# Patient Record
Sex: MALE | Race: ASIAN | Employment: FULL TIME | URBAN - METROPOLITAN AREA
[De-identification: names, ages, dates, MRNs, and addresses within clinical notes are randomized per-mention and may not be internally consistent; named-entity substitution may affect disease eponyms.]

---

## 2017-08-14 ENCOUNTER — RADIANT APPOINTMENT (OUTPATIENT)
Dept: GENERAL RADIOLOGY | Facility: CLINIC | Age: 42
End: 2017-08-14
Attending: PHYSICAL MEDICINE & REHABILITATION
Payer: COMMERCIAL

## 2017-08-14 ENCOUNTER — OFFICE VISIT (OUTPATIENT)
Dept: ORTHOPEDICS | Facility: CLINIC | Age: 42
End: 2017-08-14
Payer: COMMERCIAL

## 2017-08-14 VITALS
HEIGHT: 70 IN | WEIGHT: 180 LBS | BODY MASS INDEX: 25.77 KG/M2 | DIASTOLIC BLOOD PRESSURE: 70 MMHG | SYSTOLIC BLOOD PRESSURE: 105 MMHG

## 2017-08-14 DIAGNOSIS — M21.42 PES PLANUS OF BOTH FEET: ICD-10-CM

## 2017-08-14 DIAGNOSIS — S86.899A MEDIAL TIBIAL STRESS SYNDROME, UNSPECIFIED LATERALITY, INITIAL ENCOUNTER: Primary | ICD-10-CM

## 2017-08-14 DIAGNOSIS — M25.562 ARTHRALGIA OF BOTH LOWER LEGS: ICD-10-CM

## 2017-08-14 DIAGNOSIS — M21.41 PES PLANUS OF BOTH FEET: ICD-10-CM

## 2017-08-14 DIAGNOSIS — M25.561 ARTHRALGIA OF BOTH LOWER LEGS: ICD-10-CM

## 2017-08-14 PROCEDURE — 73590 X-RAY EXAM OF LOWER LEG: CPT | Mod: LT | Performed by: PHYSICAL MEDICINE & REHABILITATION

## 2017-08-14 PROCEDURE — 99203 OFFICE O/P NEW LOW 30 MIN: CPT | Performed by: PHYSICAL MEDICINE & REHABILITATION

## 2017-08-14 NOTE — MR AVS SNAPSHOT
After Visit Summary   8/14/2017    Evans Robertson    MRN: [id number]           Patient Information     Date Of Birth          1975        Visit Information        Provider Department      8/14/2017 1:40 PM Jamir Hernandez DO [facility]        Today's Diagnoses     Medial tibial stress syndrome, unspecified laterality, initial encounter    -  1      Care Instructions    We addressed the following today:    1. Shin splints  2. Flat feet    Activity modification as discussed  Physical therapy: [facility] - [phone]  Topical Treatments: Ice  Over the counter medication: Acetaminophen (Tylenol) 1000 mg every 6 hours with food (Maximum of 3000 mg/day)  Ibuprofen (Advil) maximum of 800 mg four times a day with food  Other specific instructions: Obtain Super Feet shoe inserts for treatment purposes  Follow up in 4-6 weeks if no improvement of symptoms for further evaluation/medical car  (sooner if needed; call direct clinic number [[phone]] at any time with questions or concerns)              Follow-ups after your visit        Additional Services     [facility] PT, HAND, AND CHIROPRACTIC REFERRAL       **This order will print in the [facility] Scheduling Office**    Physical Therapy, Hand Therapy and Chiropractic Care are available through:    *[facility]  *[facility]  *[facility]    Call one number to schedule at any of the above locations: [phone].    Your provider has referred you to: Physical Therapy at [facility] or [facility]    Indication/Reason for Referral: Shin splints  Therapy Orders: Evaluate and Treat  Special Programs: Running Injury  Special Request: Brody Cotton      Additional Comments for the Therapist or Chiropractor: Formal physical therapy - exercises to includes active resistance dorsiflexion/plantarflexion exercises, including leg presses, heel raises, etc with progression to  "advanced proprioception/balance training with use of modalities as deemed appropriate with home exercise prescription.    Please be aware that coverage of these services is subject to the terms and limitations of your health insurance plan.  Call member services at your health plan with any benefit or coverage questions.      Please bring the following to your appointment:    *Your personal calendar for scheduling future appointments  *Comfortable clothing                  Who to contact     If you have questions or need follow up information about today's clinic visit or your schedule please contact [facility] directly at [phone].  Normal or non-critical lab and imaging results will be communicated to you by Devexhart, letter or phone within 4 business days after the clinic has received the results. If you do not hear from us within 7 days, please contact the clinic through [employer] or phone. If you have a critical or abnormal lab result, we will notify you by phone as soon as possible.  Submit refill requests through Klip or call your pharmacy and they will forward the refill request to us. Please allow 3 business days for your refill to be completed.          Additional Information About Your Visit        [employer] Information     Klip lets you send messages to your doctor, view your test results, renew your prescriptions, schedule appointments and more. To sign up, go to [email or web address] . Click on \"Log in\" on the left side of the screen, which will take you to the Welcome page. Then click on \"Sign up Now\" on the right side of the page.     You will be asked to enter the access code listed below, as well as some personal information. Please follow the directions to create your username and password.     Your access code is: [email or web address]  Expires: 2017  2:44 PM     Your access code will  in 90 days. If you need help or a new code, please call your [place] clinic or " "[phone].        Care EveryWhere ID     This is your Care EveryWhere ID. This could be used by other organizations to access your [facility] medical records  [id number]        Your Vitals Were     Height BMI (Body Mass Index)                5' 10\" (1.778 m) 25.83 kg/m2           Blood Pressure from Last 3 Encounters:   08/14/17 105/70   08/16/05 92/54    Weight from Last 3 Encounters:   08/14/17 180 lb (81.6 kg)   08/16/05 187 lb (84.8 kg)              We Performed the Following     [facility] PT, HAND, AND CHIROPRACTIC REFERRAL        Primary Care Provider    Jennifer Streeter MD       No address on file        Equal Access to Services     [facility] : Hadii laura Romero, wateo arias, anita kaalmada wendy, luis jackson . So [facility] [phone].    ATENCIÓN: Si habla español, tiene a dugan disposición servicios gratuitos de asistencia lingüística. Llame al [phone].    We comply with applicable federal civil rights laws and [place] laws. We do not discriminate on the basis of race, color, national origin, age, disability sex, sexual orientation or gender identity.            Thank you!     Thank you for choosing [facility]  for your care. Our goal is always to provide you with excellent care. Hearing back from our patients is one way we can continue to improve our services. Please take a few minutes to complete the written survey that you may receive in the mail after your visit with us. Thank you!             Your Updated Medication List - Protect others around you: Learn how to safely use, store and throw away your medicines at www.disposemymeds.org.          This list is accurate as of: 8/14/17  2:44 PM.  Always use your most recent med list.                   Brand Name Dispense Instructions for use Diagnosis    NASONEX 50 MCG/ACT spray   Generic drug:  mometasone     1    2 sprays each nostril Once daily for allergies          "

## 2017-08-14 NOTE — PATIENT INSTRUCTIONS
We addressed the following today:    1. Shin splints  2. Flat feet    Activity modification as discussed  Physical therapy: [facility] - [phone]  Topical Treatments: Ice  Over the counter medication: Acetaminophen (Tylenol) 1000 mg every 6 hours with food (Maximum of 3000 mg/day)  Ibuprofen (Advil) maximum of 800 mg four times a day with food  Other specific instructions: Obtain Super Feet shoe inserts for treatment purposes  Follow up in 4-6 weeks if no improvement of symptoms for further evaluation/medical car  (sooner if needed; call direct clinic number [[phone]] at any time with questions or concerns)

## 2017-08-14 NOTE — PROGRESS NOTES
" [facility]   Clinic Visit s Aug 14, 2017    Subjective:  Evans Robertson is a 42 year old male who is seen as self referral for evaluation of acute bilateral leg pain, right greater than left.    Symptoms began 1 month ago.  Reports insidious onset without acute precipitating event. Recently started to get back into running starting in May, started running about 1 mile at a time and has built up his mileage to 6 miles at a time, approximately 3-4 times/week. Reports bilateral medial leg pain, right greater than left, with radiation absent.  Pain is 5/10 in maximal severity and 0/10 currently.  Symptoms are generally worse with running activities and with going down stairs and better with rest.  Other treatment has consisted of ice and stretching activities with minimal relief. Reports feeling of numbness/weakness of the lower extremities. Denies any previous leg injuries/surgeries.    Patient's past medical, surgical, social, and family histories are reviewed today.  There are no significant contributory medical issues  Past Medical History:   Diagnosis Date     Allergic rhinitis, cause unspecified     Managed by allergist       Review of Systems:  Constitutional: NEGATIVE for fever, chills, or change in weight  Skin: NEGATIVE for worrisome rashes, moles, or lesions  Neuro: POSITIVE for weakness of the lower extremities  MSK: see HPI  Additional 10 point ROS is negative other than symptoms noted above and in HPI    Objective:  /70  Ht 5' 10\" (1.778 m)  Wt 180 lb (81.6 kg)  BMI 25.83 kg/m2  General: healthy, alert, and in no distress  Skin: no suspicious lesions or rashes  Psych: mentation appears normal and affect normal/bright  HEENT: no scleral icterus  CV: no pedal edema  Resp: normal respiratory effort without conversational dyspnea   Neuro: motor strength as noted below  Lymph: no palpable lymphadenopathy    MSK:    BILATERAL LEGS  Inspection:    Flat feet without erythema or " ecchymosis  Palpation:    Not tender over the proximal tibia, middle tibia, distal tibia, proximal fibula, distal fibula, mid fibula, lateral gastrocnemius, and medial gastrocnemius  Strength:    Dorsiflexion 5/5, plantarflexion 5/5, inversion 5/5, and eversion 5/5  Special Tests:    Positive hop test on the right, negative hop test on the left, negative squeeze testing bilaterally, and negative Winslow's testing bilaterally    Imaging:  Bilateral tibia/fibula x-rays (3 views) were ordered, independent visualization of images was performed, and interpreted in the office today  Impression:   1. Cortical irregularity noted of the posterior portion of the left tibia of unknown significance.  2. Fabella noted of the posterior portion of the knees.   3. Negative for fracture, subluxation, or other acute osseous abnormalities.    ASSESSMENT:  1. Bilateral medial tibial stress syndrome  2. Bilateral pes planus    PLAN:  1. Activity modification as discussed, including limitation of activities that cause pain/discomfort.  Instructed on importance of alternating running with cross training activities and to cut back on running activities by approximately 50% and increase by approximately 10% per week as able as tolerated.  2. Acetaminophen/Ibuprofen/ice as needed for improved pain control.  3. Instructed to obtain over-the-counter Super Feet shoe inserts for further treatment purposes.  4. Formal physical therapy - exercises to includes active resistance dorsiflexion/plantarflexion exercises, including leg presses, heel raises, etc with progression to advanced proprioception/balance training with use of modalities as deemed appropriate with home exercise prescription.  5. Follow-up in 4-6 weeks for further evaluation/medical care.  Instructed to contact our office should the condition evolve or worsen.    Patient's conditions were thoroughly discussed during today's visit with greater than 50% of the visit spent counseling  the patient with total time spent face-to-face with the patient being 15 minutes.    Jamir Hernandez DO, CAM  [facility]    Disclaimer: This note consists of symbols derived from keyboarding, dictation and/or voice recognition software. As a result, there may be errors in the script that have gone undetected. Please consider this when interpreting information found in this chart.

## 2017-08-18 ENCOUNTER — THERAPY VISIT (OUTPATIENT)
Dept: PHYSICAL THERAPY | Facility: CLINIC | Age: 42
End: 2017-08-18
Payer: COMMERCIAL

## 2017-08-18 DIAGNOSIS — S86.891A SHIN SPLINTS, RIGHT, INITIAL ENCOUNTER: Primary | ICD-10-CM

## 2017-08-18 PROCEDURE — 97110 THERAPEUTIC EXERCISES: CPT | Mod: GP | Performed by: PHYSICAL THERAPIST

## 2017-08-18 PROCEDURE — 97161 PT EVAL LOW COMPLEX 20 MIN: CPT | Mod: GP | Performed by: PHYSICAL THERAPIST

## 2017-08-18 NOTE — PROGRESS NOTES
Subjective:    Patient is a 42 year old male presenting with rehab right ankle/foot hpi.   Affected Side: bilateral shins R>L.  Condition occurred with:  Repetition/overuse.  Condition occurred: during recreation/sport.  This is a new condition  Onset of bilateral lower leg pain secondary to increasing running mileage 5 weeks ago. Pt has noted decreased pain since stopping running. Pt referred by MD for physical therapy on 8-14-17.    Patient reports pain:  Medial.    Pain is described as aching and sharp and is intermittent and reported as 5/10.  Associated symptoms:  Tingling, numbness and buckling/giving out. Pain is worse during the day.  Symptoms are exacerbated by ascending stairs, descending stairs, running, walking, weight bearing and bending/squatting and relieved by rest and ice.  Since onset symptoms are gradually improving.  Special tests:  X-ray (see report).  Previous treatment: none.    General health as reported by patient is good.  Pertinent medical history includes:  Sleep disorder/apnea.  Medical allergies: no.  Other surgeries include:  None reported.  Current medications:  None as reported by patient.  Current occupation is Teacher.  Employment status: pt on summer break, returns to work in 1 week.  Primary job tasks include:  Prolonged standing.        Red flags:  Chest pain.                        Objective:    Standing Alignment:                Ankle/Foot:  Pes planus R and pes planus L              Ankle/Foot Evaluation  ROM:    AROM:    Dorsiflexion: Left:    Right:   10  Plantarflexion: Left:     Right:  58          PROM:    Dorsiflexion: Left:        Right:   15   Plantarflexion: Left:        Right:  60               Strength:    Dorsiflexion:  Left: 4-/5     Pain:   Right: 4-/5   Pain:  Plantarflexion: Left: 5/5   Pain:   Right: 5/5  Pain:  Inversion:Left: 5/5  Pain:     Right: 5/5  Pain:  Eversion:Left: 5/5  Pain:  Right: 5/5  Pain:              Strength wnl ankle: weak pelvic  stabilizers.  LIGAMENT TESTING: normal                PALPATION: Palpation of ankle: point tenderness bilateral medial tibial ridge.                                                          General     ROS    Assessment/Plan:      Patient is a 42 year old male with both sides ankle complaints.    Patient has the following significant findings with corresponding treatment plan.                Diagnosis 1:  Bilateral shin splints R>L  Pain -  hot/cold therapy, manual therapy, self management, education and home program  Decreased ROM/flexibility - manual therapy, therapeutic exercise, therapeutic activity and home program  Decreased strength - therapeutic exercise, therapeutic activities and home program    Therapy Evaluation Codes:   1) History comprised of:   Personal factors that impact the plan of care:      None.    Comorbidity factors that impact the plan of care are:      Sleep disorder/apnea and Weakness.     Medications impacting care: None.  2) Examination of Body Systems comprised of:   Body structures and functions that impact the plan of care:      Ankle and Hip.   Activity limitations that impact the plan of care are:      Jumping, Running, Sports, Stairs and Walking.  3) Clinical presentation characteristics are:   Stable/Uncomplicated.  4) Decision-Making    Low complexity using standardized patient assessment instrument and/or measureable assessment of functional outcome.  Cumulative Therapy Evaluation is: Low complexity.    Previous and current functional limitations:  (See Goal Flow Sheet for this information)    Short term and Long term goals: (See Goal Flow Sheet for this information)     Communication ability:  Patient appears to be able to clearly communicate and understand verbal and written communication and follow directions correctly.  Treatment Explanation - The following has been discussed with the patient:   RX ordered/plan of care  Anticipated outcomes  Possible risks and side  effects  This patient would benefit from PT intervention to resume normal activities.   Rehab potential is good.    Frequency:  1 X week, once daily  Duration:  for 6 weeks  Discharge Plan:  Achieve all LTG.  Independent in home treatment program.  Reach maximal therapeutic benefit.    Please refer to the daily flowsheet for treatment today, total treatment time and time spent performing 1:1 timed codes.

## 2017-08-18 NOTE — MR AVS SNAPSHOT
After Visit Summary   8/18/2017    Evans Robertson    MRN: [id number]           Patient Information     Date Of Birth          1975        Visit Information        Provider Department      8/18/2017 11:30 AM Aaron Givens, PT [facility]        Today's Diagnoses     Shin splints, right, initial encounter    -  1       Follow-ups after your visit        Your next 10 appointments already scheduled     Aug 25, 2017  2:40 PM CDT   [facility] with Aaron Givens, PT   [facility]    [address]  [address] [place] [address]   [phone]            Sep 01, 2017  5:20 PM CDT   [facility] Running with Aaron Givens, PT   [facility] ([facility] [place]  )    [address]  [address] [place] [address]   [phone]            Sep 06, 2017  4:00 PM CDT   [facility] Running with Aaron Givens, PT   [facility] ([facility] [place]  )    [address] [address] [address]   [phone]              Who to contact     If you have questions or need follow up information about today's clinic visit or your schedule please contact [facility] directly at [phone].  Normal or non-critical lab and imaging results will be communicated to you by [employer], letter or phone within 4 business days after the clinic has received the results. If you do not hear from us within 7 days, please contact the clinic through [employer] or phone. If you have a critical or abnormal lab result, we will notify you by phone as soon as possible.  Submit refill requests through [employer] or call your pharmacy and they will forward the refill request to us. Please allow 3 business days for your refill to be completed.          Additional Information About Your Visit        [employer] Information     [employer] lets you send messages to your doctor, view your test results, renew your prescriptions, schedule appointments and more. To sign up, go to  "[email or web address] . Click on \"Log in\" on the left side of the screen, which will take you to the Welcome page. Then click on \"Sign up Now\" on the right side of the page.     You will be asked to enter the access code listed below, as well as some personal information. Please follow the directions to create your username and password.     Your access code is: [email or web address]  Expires: 2017  2:44 PM     Your access code will  in 90 days. If you need help or a new code, please call your [place] clinic or [phone].        Care EveryWhere ID     This is your Care EveryWhere ID. This could be used by other organizations to access your [place] medical records  [id number]         Blood Pressure from Last 3 Encounters:   17 105/70   05 92/54    Weight from Last 3 Encounters:   17 81.6 kg (180 lb)   05 84.8 kg (187 lb)              We Performed the Following     [facility] Inital Eval Report     PT Eval, Low Complexity (11617)     Therapeutic Exercises        Primary Care Provider    None Doctor, MD       No address on file        Equal Access to Services     [facility]: Hadii laura mcmahano Nick, waaxda luqadaha, qaybta kaalmada adedorisyada, luis jackson . So [facility] [phone].    ATENCIÓN: Si habla español, tiene a dugan disposición servicios gratuitos de asistencia lingüística. LlKettering Health Hamilton [phone].    We comply with applicable federal civil rights laws and [place] laws. We do not discriminate on the basis of race, color, national origin, age, disability sex, sexual orientation or gender identity.            Thank you!     Thank you for choosing [facility]  for your care. Our goal is always to provide you with excellent care. Hearing back from our patients is one way we can continue to improve our services. Please take a few minutes to complete the written survey that you may receive in the mail after your visit with us. Thank you!           "   Your Updated Medication List - Protect others around you: Learn how to safely use, store and throw away your medicines at www.disposemymeds.org.          This list is accurate as of: 8/18/17 12:38 PM.  Always use your most recent med list.                   Brand Name Dispense Instructions for use Diagnosis    NASONEX 50 MCG/ACT spray   Generic drug:  mometasone     1    2 sprays each nostril Once daily for allergies

## 2017-08-25 ENCOUNTER — THERAPY VISIT (OUTPATIENT)
Dept: PHYSICAL THERAPY | Facility: CLINIC | Age: 42
End: 2017-08-25
Payer: COMMERCIAL

## 2017-08-25 DIAGNOSIS — S86.891A SHIN SPLINTS, RIGHT, INITIAL ENCOUNTER: ICD-10-CM

## 2017-08-25 PROCEDURE — 97530 THERAPEUTIC ACTIVITIES: CPT | Mod: GP | Performed by: PHYSICAL THERAPIST

## 2017-08-25 PROCEDURE — 97110 THERAPEUTIC EXERCISES: CPT | Mod: GP | Performed by: PHYSICAL THERAPIST

## 2017-08-25 NOTE — MR AVS SNAPSHOT
"              After Visit Summary   8/25/2017    Evans Robertson    MRN: [id number]           Patient Information     Date Of Birth          1975        Visit Information        Provider Department      8/25/2017 2:40 PM Aaron Givens, PT [facility]        Today's Diagnoses     Shin splints, right, initial encounter           Follow-ups after your visit        Your next 10 appointments already scheduled     Sep 01, 2017  5:20 PM CDT   [facility] Running with Aaron Givens PT   [facility] ([facility]  )    [address] [facility]  [address] [place] [address]   [phone]            Sep 06, 2017  4:00 PM CDT   [facility] Running with Aaron Givens, PT   [facility] ([facility]  )    [address] [address] [address]   [phone]              Who to contact     If you have questions or need follow up information about today's clinic visit or your schedule please contact [facility] directly at [phone].  Normal or non-critical lab and imaging results will be communicated to you by Wisrhart, letter or phone within 4 business days after the clinic has received the results. If you do not hear from us within 7 days, please contact the clinic through Wisrhart or phone. If you have a critical or abnormal lab result, we will notify you by phone as soon as possible.  Submit refill requests through [employer] or call your pharmacy and they will forward the refill request to us. Please allow 3 business days for your refill to be completed.          Additional Information About Your Visit        WisrharClue App Information     [employer] lets you send messages to your doctor, view your test results, renew your prescriptions, schedule appointments and more. To sign up, go to [email or web address] . Click on \"Log in\" on the left side of the screen, which will take you to the Welcome page. Then click on \"Sign up Now\" on the right side of the page.     You will be asked to enter the " access code listed below, as well as some personal information. Please follow the directions to create your username and password.     Your access code is: [email or web address]  Expires: 2017  2:44 PM     Your access code will  in 90 days. If you need help or a new code, please call your [place] clinic or [phone].        Care EveryWhere ID     This is your Care EveryWhere ID. This could be used by other organizations to access your [place] medical records  [id number]         Blood Pressure from Last 3 Encounters:   17 105/70   05 92/54    Weight from Last 3 Encounters:   17 81.6 kg (180 lb)   05 84.8 kg (187 lb)              We Performed the Following     Therapeutic Activities     Therapeutic Exercises        Primary Care Provider    Jennifer Streeter MD       No address on file        Equal Access to Services     [facility]: Hadii aad ku hadasho Sofrancis, waaxda luqadaha, qaybta kaalmada wendy, luis jackson . So [facility] [phone].    ATENCIÓN: Si habla español, tiene a dugan disposición servicios gratuitos de asistencia lingüística. Llame al [phone].    We comply with applicable federal civil rights laws and [place] laws. We do not discriminate on the basis of race, color, national origin, age, disability sex, sexual orientation or gender identity.            Thank you!     Thank you for choosing [facility]  for your care. Our goal is always to provide you with excellent care. Hearing back from our patients is one way we can continue to improve our services. Please take a few minutes to complete the written survey that you may receive in the mail after your visit with us. Thank you!             Your Updated Medication List - Protect others around you: Learn how to safely use, store and throw away your medicines at www.disposemymeds.org.          This list is accurate as of: 17  3:42 PM.  Always use your most recent med list.                    Brand Name Dispense Instructions for use Diagnosis    NASONEX 50 MCG/ACT spray   Generic drug:  mometasone     1    2 sprays each nostril Once daily for allergies

## 2017-08-25 NOTE — PROGRESS NOTES
Subjective:    HPI                    Objective:    System    Physical Exam    General     ROS    Assessment/Plan:      SUBJECTIVE  Subjective changes as noted by pt:  Pt feels exercises are helping with balance and strength     Current pain level: 2/10     Changes in function:  Pt was able to run twice in the past week, one time for 1 mile another for 2 miles     Adverse reaction to treatment or activity:  None    OBJECTIVE  Changes in objective findings:  Hard foot slap with pt running on treadmill. Pt was able to decrease foot slap with verbal coaching.         ASSESSMENT  Evans continues to require intervention to meet STG and LTG's: PT  Patient's symptoms are resolving.  Response to therapy has shown an improvement in  strength  Progress made towards STG/LTG?  Yes,     PLAN  Current treatment program is being advanced to more complex exercises.    PTA/ATC plan:  N/A    Please refer to the daily flowsheet for treatment today, total treatment time and time spent performing 1:1 timed codes.

## 2017-09-06 ENCOUNTER — THERAPY VISIT (OUTPATIENT)
Dept: PHYSICAL THERAPY | Facility: CLINIC | Age: 42
End: 2017-09-06
Payer: COMMERCIAL

## 2017-09-06 DIAGNOSIS — S86.891A SHIN SPLINTS, RIGHT, INITIAL ENCOUNTER: ICD-10-CM

## 2017-09-06 PROCEDURE — 97530 THERAPEUTIC ACTIVITIES: CPT | Mod: GP | Performed by: PHYSICAL THERAPIST

## 2017-09-06 PROCEDURE — 97110 THERAPEUTIC EXERCISES: CPT | Mod: GP | Performed by: PHYSICAL THERAPIST

## 2017-09-06 NOTE — MR AVS SNAPSHOT
"              After Visit Summary   9/6/2017    Evans Robertson    MRN: [id number]           Patient Information     Date Of Birth          1975        Visit Information        Provider Department      9/6/2017 4:00 PM Aaron Givens, PT [facility]        Today's Diagnoses     Shin splints, right, initial encounter           Follow-ups after your visit        Your next 10 appointments already scheduled     Sep 19, 2017  3:20 PM CDT   [facility] Running with ALEX Landers PT ([facility]  )    [address] [address] [address]   [phone]              Who to contact     If you have questions or need follow up information about today's clinic visit or your schedule please contact [facility] directly at [phone].  Normal or non-critical lab and imaging results will be communicated to you by MyChart, letter or phone within 4 business days after the clinic has received the results. If you do not hear from us within 7 days, please contact the clinic through MyChart or phone. If you have a critical or abnormal lab result, we will notify you by phone as soon as possible.  Submit refill requests through [employer] or call your pharmacy and they will forward the refill request to us. Please allow 3 business days for your refill to be completed.          Additional Information About Your Visit        MyChart Information     Vadio lets you send messages to your doctor, view your test results, renew your prescriptions, schedule appointments and more. To sign up, go to [email or web address] . Click on \"Log in\" on the left side of the screen, which will take you to the Welcome page. Then click on \"Sign up Now\" on the right side of the page.     You will be asked to enter the access code listed below, as well as some personal information. Please follow the directions to create your username and password.     Your access code is: [email or web address]  Expires: 11/12/2017  " 2:44 PM     Your access code will  in 90 days. If you need help or a new code, please call your [place] clinic or [phone].        Care EveryWhere ID     This is your Care EveryWhere ID. This could be used by other organizations to access your [place] medical records  [id number]         Blood Pressure from Last 3 Encounters:   17 105/70   05 92/54    Weight from Last 3 Encounters:   17 81.6 kg (180 lb)   05 84.8 kg (187 lb)              We Performed the Following     Therapeutic Activities     Therapeutic Exercises        Primary Care Provider    None Doctor, MD       No address on file        Equal Access to Services     [facility]: Hadii laura Romero, waaxda lufrancoadaha, qaybta kaalmada wendy, luis jackson . So [facility] [phone].    ATENCIÓN: Si habla español, tiene a dugan disposición servicios gratuitos de asistencia lingüística. Llame al [phone].    We comply with applicable federal civil rights laws and [place] laws. We do not discriminate on the basis of race, color, national origin, age, disability sex, sexual orientation or gender identity.            Thank you!     Thank you for choosing [facility]  for your care. Our goal is always to provide you with excellent care. Hearing back from our patients is one way we can continue to improve our services. Please take a few minutes to complete the written survey that you may receive in the mail after your visit with us. Thank you!             Your Updated Medication List - Protect others around you: Learn how to safely use, store and throw away your medicines at www.disposemymeds.org.          This list is accurate as of: 17  5:43 PM.  Always use your most recent med list.                   Brand Name Dispense Instructions for use Diagnosis    NASONEX 50 MCG/ACT spray   Generic drug:  mometasone     1    2 sprays each nostril Once daily for allergies

## 2017-09-06 NOTE — PROGRESS NOTES
Subjective:    HPI                    Objective:    System    Physical Exam    General     ROS    Assessment/Plan:      SUBJECTIVE  Subjective changes as noted by pt:  Pt has changed his running pattern to emphasize heel/ toe with softer landing and pt notes increased fatigue in the muscle with new running style     Current pain level: 4/10     Changes in function:  Pt was able to run 2.5 miles with mild to moderate pain     Adverse reaction to treatment or activity:  None    OBJECTIVE  Changes in objective findings:  Improved running gait with softer landing and better eccentric control of foot drop. Progressing with strengthening of ankles to provide increased stability.         ASSESSMENT  Evans continues to require intervention to meet STG and LTG's: PT  Patient's symptoms are resolving.  Response to therapy has shown an improvement in  function  Progress made towards STG/LTG?  Yes,     PLAN  Current treatment program is being advanced to more complex exercises.    PTA/ATC plan:  N/A    Please refer to the daily flowsheet for treatment today, total treatment time and time spent performing 1:1 timed codes.

## 2017-09-19 ENCOUNTER — THERAPY VISIT (OUTPATIENT)
Dept: PHYSICAL THERAPY | Facility: CLINIC | Age: 42
End: 2017-09-19
Payer: COMMERCIAL

## 2017-09-19 DIAGNOSIS — S86.891A SHIN SPLINTS, RIGHT, INITIAL ENCOUNTER: ICD-10-CM

## 2017-09-19 PROCEDURE — 97530 THERAPEUTIC ACTIVITIES: CPT | Mod: GP | Performed by: PHYSICAL THERAPIST

## 2017-09-19 PROCEDURE — 97110 THERAPEUTIC EXERCISES: CPT | Mod: GP | Performed by: PHYSICAL THERAPIST

## 2017-09-19 NOTE — PROGRESS NOTES
Subjective:    HPI                    Objective:    System    Physical Exam    General     ROS    Assessment/Plan:      DISCHARGE REPORT    Progress reporting period is from 8-18-17 to 9-19-17.       SUBJECTIVE  Subjective changes noted by patient:  Pt notes decreased pain and increased strength in the legs.       Current pain level is 1/10  .     Previous pain level was  5/10  .   Changes in function:  Pt has progressed to running 3 miles without difficulty. Pt denies pain with all other ADL's  Adverse reaction to treatment or activity: None    OBJECTIVE  Changes noted in objective findings:  Ankle ROM WNL. MMT of ankles WNL except DR right 4/5, left 4/5. Pt denies pain with palpation of the anterior aspect of the lower legs. Pt able to run with decreased foot slap        ASSESSMENT/PLAN  Updated problem list and treatment plan: Diagnosis 1:  Bilateral shin splints  Pain -  hot/cold therapy, self management, education and home program  Decreased ROM/flexibility - therapeutic exercise, therapeutic activity and home program  Decreased strength - therapeutic exercise, therapeutic activities and home program  STG/LTGs have been met or progress has been made towards goals:  Yes,   Assessment of Progress: The patient's condition is improving.  Self Management Plans:  Patient has been instructed in a home treatment program.  I have re-evaluated this patient and find that the nature, scope, duration and intensity of the therapy is appropriate for the medical condition of the patient.  Evans continues to require the following intervention to meet STG and LTG's:  PT intervention is no longer required to meet STG/LTG.    Recommendations:  This patient is ready to be discharged from therapy and continue their home treatment program.    Please refer to the daily flowsheet for treatment today, total treatment time and time spent performing 1:1 timed codes.

## 2017-09-19 NOTE — MR AVS SNAPSHOT
"              After Visit Summary   2017    Evans Robertson    MRN: [id number]           Patient Information     Date Of Birth          1975        Visit Information        Provider Department      2017 3:20 PM Aaron Givens, PT [facility]        Today's Diagnoses     Shin splints, right, initial encounter           Follow-ups after your visit        Who to contact     If you have questions or need follow up information about today's clinic visit or your schedule please contact [facility] directly at [phone].  Normal or non-critical lab and imaging results will be communicated to you by "[employer]hart, letter or phone within 4 business days after the clinic has received the results. If you do not hear from us within 7 days, please contact the clinic through "[employer]hart or phone. If you have a critical or abnormal lab result, we will notify you by phone as soon as possible.  Submit refill requests through [employer] or call your pharmacy and they will forward the refill request to us. Please allow 3 business days for your refill to be completed.          Additional Information About Your Visit        MyChart Information     [employer] lets you send messages to your doctor, view your test results, renew your prescriptions, schedule appointments and more. To sign up, go to [email or web address] . Click on \"Log in\" on the left side of the screen, which will take you to the Welcome page. Then click on \"Sign up Now\" on the right side of the page.     You will be asked to enter the access code listed below, as well as some personal information. Please follow the directions to create your username and password.     Your access code is: [email or web address]  Expires: 2017  2:44 PM     Your access code will  in 90 days. If you need help or a new code, please call your [place] clinic or [phone].        Care EveryWhere ID     This is your Care EveryWhere ID. This could be used by other " organizations to access your [place] medical records  [id number]         Blood Pressure from Last 3 Encounters:   08/14/17 105/70   08/16/05 92/54    Weight from Last 3 Encounters:   08/14/17 81.6 kg (180 lb)   08/16/05 84.8 kg (187 lb)              We Performed the Following     Therapeutic Activities     Therapeutic Exercises        Primary Care Provider    None , MD       No address on file        Equal Access to Services     [facility]: Hadii aad ku hadasho Soomaali, waaxda luqadaha, qaybta kaalmada adeegyada, waxay idiin hayguillen adedoris rubio laliliann . So [facility] [phone].    ATENCIÓN: Si habla español, tiene a dugan disposición servicios gratuitos de asistencia lingüística. Llame al [phone].    We comply with applicable federal civil rights laws and [place] laws. We do not discriminate on the basis of race, color, national origin, age, disability sex, sexual orientation or gender identity.            Thank you!     Thank you for choosing [facility]  for your care. Our goal is always to provide you with excellent care. Hearing back from our patients is one way we can continue to improve our services. Please take a few minutes to complete the written survey that you may receive in the mail after your visit with us. Thank you!             Your Updated Medication List - Protect others around you: Learn how to safely use, store and throw away your medicines at www.disposemymeds.org.          This list is accurate as of: 9/19/17  6:16 PM.  Always use your most recent med list.                   Brand Name Dispense Instructions for use Diagnosis    NASONEX 50 MCG/ACT spray   Generic drug:  mometasone     1    2 sprays each nostril Once daily for allergies

## 2018-06-27 ENCOUNTER — THERAPY VISIT (OUTPATIENT)
Dept: PHYSICAL THERAPY | Facility: CLINIC | Age: 43
End: 2018-06-27
Payer: COMMERCIAL

## 2018-06-27 DIAGNOSIS — S76.312A LEFT HAMSTRING MUSCLE STRAIN, INITIAL ENCOUNTER: Primary | ICD-10-CM

## 2018-06-27 PROCEDURE — 97110 THERAPEUTIC EXERCISES: CPT | Mod: GP | Performed by: PHYSICAL THERAPIST

## 2018-06-27 PROCEDURE — 97161 PT EVAL LOW COMPLEX 20 MIN: CPT | Mod: GP | Performed by: PHYSICAL THERAPIST

## 2018-06-27 NOTE — PROGRESS NOTES
[facility] Initial Evaluation  Subjective:  Patient is a 42 year old male presenting with rehab left knee hpi. The history is provided by the patient.   Evans Robertson is a 42 year old male with a left knee condition.  Condition occurred with:  Running.  Condition occurred: during recreation/sport.  This is a new condition  Onset of left posterior knee pain 6-9-18 after a 10 mile run. Pt rested on week and then ran a 1/2 marathon 6-16-19. Pt noted increased pain 6-25-18 after running 3 miles. Pt is a self referred pt. .    Patient reports pain:  Posterior.    Pain is described as aching and sharp and is intermittent and reported as 3/10.  Associated symptoms:  Loss of motion/stiffness and loss of strength. Pain is worse during the day.  Symptoms are exacerbated by transfers (stretching, extending knee after being in a flexed position, sit to stand) and relieved by activity/movement.  Since onset symptoms are unchanged.  Special testing: none.  Previous treatment: none.    General health as reported by patient is good.  Pertinent medical history includes:  Sleep disorder/apnea.  Medical allergies: no.  Surgical history: none.  Current medications:  None as reported by the patient.  Current occupation is teacher.    Primary job tasks include:  Prolonged standing.        Red flags:  None as reported by the patient.                        Objective:        Flexibility/Screens:       Lower Extremity:  Decreased left lower extremity flexibility:Hamstrings                                                        Knee Evaluation:  ROM:  Strength wnl knee: weak pelvic stabilizers.  AROM    Hyperextension: Left:  0    Right:  Extension: Left: 0    Right:   Flexion: Left: 110   Right:  PROM    Hyperextension: Left: 0   Right:   Extension: Left: 0   Right:   Flexion: Left: 120   Right:       Strength:     Extension:  Left: 5/5   Pain:        Flexion:  Left: 4/5   Weak/painful  Pain:              Special  Tests:     Left knee negative for the following special tests:  Meninscal    Palpation:    Left knee tenderness present at:  Semitendinosus              General     ROS    Assessment/Plan:    Patient is a 42 year old male with left side knee complaints.    Patient has the following significant findings with corresponding treatment plan.                Diagnosis 1:  Left hamstring strain  Pain -  hot/cold therapy, manual therapy, self management, education and home program  Decreased ROM/flexibility - manual therapy, therapeutic exercise, therapeutic activity and home program  Decreased strength - therapeutic exercise, therapeutic activities and home program    Therapy Evaluation Codes:   1) History comprised of:   Personal factors that impact the plan of care:      None.    Comorbidity factors that impact the plan of care are:      Sleep disorder/apnea.     Medications impacting care: None.  2) Examination of Body Systems comprised of:   Body structures and functions that impact the plan of care:      Hip and Knee.   Activity limitations that impact the plan of care are:      Running and transfers, stretching.  3) Clinical presentation characteristics are:   Stable/Uncomplicated.  4) Decision-Making    Low complexity using standardized patient assessment instrument and/or measureable assessment of functional outcome.  Cumulative Therapy Evaluation is: Low complexity.    Previous and current functional limitations:  (See Goal Flow Sheet for this information)    Short term and Long term goals: (See Goal Flow Sheet for this information)     Communication ability:  Patient appears to be able to clearly communicate and understand verbal and written communication and follow directions correctly.  Treatment Explanation - The following has been discussed with the patient:   RX ordered/plan of care  Anticipated outcomes  Possible risks and side effects  This patient would benefit from PT intervention to resume normal  activities.   Rehab potential is good.    Frequency:  1 X week, once daily  Duration:  for 6 weeks  Discharge Plan:  Achieve all LTG.  Independent in home treatment program.  Reach maximal therapeutic benefit.    Please refer to the daily flowsheet for treatment today, total treatment time and time spent performing 1:1 timed codes.

## 2022-01-11 ENCOUNTER — LAB (OUTPATIENT)
Dept: LAB | Facility: CLINIC | Age: 47
End: 2022-01-11
Attending: FAMILY MEDICINE
Payer: COMMERCIAL

## 2022-01-11 DIAGNOSIS — Z31.69 SUBFERTILITY OF COUPLE: ICD-10-CM

## 2022-01-11 PROCEDURE — 89322 SEMEN ANAL STRICT CRITERIA: CPT

## 2022-01-12 LAB
ABNORMAL SPERM MORPHOLOGY: 95
ABSTINENCE DAYS: 5 DAYS (ref 2–7)
AGGLUTINATION: NO
ANALYSIS TEMP - CENTIGRADE: 22 CENTIGRADE
COLLECTION METHOD: NORMAL
COLLECTION SITE: NORMAL
CONSENT TO RELEASE TO PARTNER: YES
DAL- RECEIVED TIME: NORMAL
HEAD DEFECT: 95 %
IMMOTILE: 17 %
LIQUEFIED: YES
MIDPIECE DEFECT: 47 %
NON-PROGRESSIVE MOTILITY: 2 %
NORMAL SPERM MORPHOLOGY: 5 % NORMAL FORMS
PROGRESSIVE MOTILITY: 81 %
ROUND CELLS: 0 MILLION/ML
SPECIMEN PH: 7.2 PH
SPECIMEN VOLUME: 4 ML
SPERM CONCENTRATION: 106 MILLION/ML
TAIL DEFECT: 4 %
TIME OF ANALYSIS: NORMAL
TOTAL PROGRESSIVE MOTILE NUMBER: 343 MILLION
TOTAL SPERM NUMBER: 424 MILLION
VISCOUS: NO
VITALITY: NORMAL

## 2022-02-17 PROBLEM — S86.891A RIGHT MEDIAL TIBIAL STRESS SYNDROME, INITIAL ENCOUNTER: Status: RESOLVED | Noted: 2017-08-18 | Resolved: 2017-09-19

## 2022-02-17 PROBLEM — S76.312A STRAIN OF LEFT HAMSTRING MUSCLE, INITIAL ENCOUNTER: Status: RESOLVED | Noted: 2018-06-27 | Resolved: 2018-08-07
